# Patient Record
Sex: MALE | Race: WHITE | NOT HISPANIC OR LATINO | URBAN - METROPOLITAN AREA
[De-identification: names, ages, dates, MRNs, and addresses within clinical notes are randomized per-mention and may not be internally consistent; named-entity substitution may affect disease eponyms.]

---

## 2021-03-12 ENCOUNTER — TELEHEALTH PROVIDED OTHER THAN IN PATIENT'S HOME (OUTPATIENT)
Dept: URBAN - METROPOLITAN AREA TELEHEALTH 12 | Facility: TELEHEALTH | Age: 41
End: 2021-03-12

## 2021-03-12 VITALS — HEIGHT: 71 IN | WEIGHT: 180 LBS

## 2021-03-12 DIAGNOSIS — K52.9 NONINFECTIVE GASTROENTERITIS AND COLITIS, UNSPECIFIED: ICD-10-CM

## 2021-03-12 PROCEDURE — 99244 OFF/OP CNSLTJ NEW/EST MOD 40: CPT | Mod: 95 | Performed by: INTERNAL MEDICINE

## 2021-03-12 NOTE — SERVICEHPINOTES
PATIENT VERIFIED BY DATE OF BIRTH AND NAME. Patient has been consented for this telecommunication visit.   39 yo male with consult for hx of colitis.  Seen by me in the past.  Pt had flare of colitis - and diarrhea was significant.  Pt was placed back on Suboxone and his sx improved.  Pt still taking this med.  Pt trying to wean off of this.  Pt rarely takes Seroquel as well.  Pt takes this for sleep.  Pt was on 5ASA in the past.  Pt denies any blood in his stool.  Pt has one BM a day for the most part.  Pt o/w doing well.  Weight is stable.

## 2021-03-17 ENCOUNTER — OFFICE (OUTPATIENT)
Dept: URBAN - METROPOLITAN AREA CLINIC 102 | Facility: CLINIC | Age: 41
End: 2021-03-17

## 2021-03-17 PROCEDURE — 00038: CPT | Performed by: INTERNAL MEDICINE

## 2021-05-24 ENCOUNTER — OFFICE (OUTPATIENT)
Dept: URBAN - METROPOLITAN AREA CLINIC 102 | Facility: CLINIC | Age: 41
End: 2021-05-24

## 2021-05-24 PROCEDURE — 00038: CPT | Performed by: INTERNAL MEDICINE

## 2023-03-17 ENCOUNTER — TELEHEALTH PROVIDED OTHER THAN IN PATIENT'S HOME (OUTPATIENT)
Dept: URBAN - METROPOLITAN AREA TELEHEALTH 12 | Facility: TELEHEALTH | Age: 43
End: 2023-03-17
Payer: MEDICAID

## 2023-03-17 VITALS — WEIGHT: 230 LBS | HEIGHT: 71 IN

## 2023-03-17 DIAGNOSIS — K52.89 OTHER SPECIFIED NONINFECTIVE GASTROENTERITIS AND COLITIS: ICD-10-CM

## 2023-03-17 PROCEDURE — 99214 OFFICE O/P EST MOD 30 MIN: CPT | Mod: 95 | Performed by: INTERNAL MEDICINE

## 2023-03-17 RX ORDER — HYOSCYAMINE SULFATE 0.12 MG/1
0.38 TABLET, ORALLY DISINTEGRATING ORAL
Qty: 90 | Refills: 2 | Status: COMPLETED
Start: 2023-03-17 | End: 2024-07-10

## 2023-03-17 RX ORDER — BALSALAZIDE DISODIUM 750 MG/1
6750 CAPSULE ORAL
Qty: 270 | Refills: 2 | Status: COMPLETED
Start: 2023-03-17 | End: 2024-07-10

## 2023-03-17 NOTE — SERVICEHPINOTES
PATIENT VERIFIED BY DATE OF BIRTH AND NAME. Patient has been consented for this telecommunication visit.   41 yo male with colitis issues.  Pt still having some issues - with bloating, gas, blood in his stool.  Pt has more constipation then diarrhea - is more constipated at times.  Pt has been taking Suboxone - taking this for his back and neck  ROS o/w negative.

## 2023-04-17 ENCOUNTER — OFFICE (OUTPATIENT)
Dept: URBAN - METROPOLITAN AREA CLINIC 102 | Facility: CLINIC | Age: 43
End: 2023-04-17

## 2023-04-17 PROCEDURE — 00031: CPT | Performed by: INTERNAL MEDICINE

## 2023-05-08 ENCOUNTER — OFFICE (OUTPATIENT)
Dept: URBAN - METROPOLITAN AREA CLINIC 98 | Facility: CLINIC | Age: 43
End: 2023-05-08
Payer: MEDICAID

## 2023-05-08 VITALS
DIASTOLIC BLOOD PRESSURE: 59 MMHG | SYSTOLIC BLOOD PRESSURE: 107 MMHG | TEMPERATURE: 98.2 F | HEART RATE: 70 BPM | HEART RATE: 63 BPM | TEMPERATURE: 98 F | OXYGEN SATURATION: 100 % | SYSTOLIC BLOOD PRESSURE: 119 MMHG | DIASTOLIC BLOOD PRESSURE: 73 MMHG | OXYGEN SATURATION: 98 % | SYSTOLIC BLOOD PRESSURE: 98 MMHG | RESPIRATION RATE: 18 BRPM | SYSTOLIC BLOOD PRESSURE: 89 MMHG | RESPIRATION RATE: 6 BRPM | DIASTOLIC BLOOD PRESSURE: 51 MMHG | RESPIRATION RATE: 12 BRPM | HEART RATE: 59 BPM | SYSTOLIC BLOOD PRESSURE: 83 MMHG | TEMPERATURE: 96.6 F | SYSTOLIC BLOOD PRESSURE: 88 MMHG | DIASTOLIC BLOOD PRESSURE: 45 MMHG | DIASTOLIC BLOOD PRESSURE: 71 MMHG | HEART RATE: 60 BPM | DIASTOLIC BLOOD PRESSURE: 63 MMHG | RESPIRATION RATE: 16 BRPM | HEIGHT: 71 IN | OXYGEN SATURATION: 97 % | SYSTOLIC BLOOD PRESSURE: 112 MMHG | WEIGHT: 212 LBS | HEART RATE: 64 BPM

## 2023-05-08 DIAGNOSIS — K52.89 OTHER SPECIFIED NONINFECTIVE GASTROENTERITIS AND COLITIS: ICD-10-CM

## 2023-05-08 DIAGNOSIS — K57.30 DIVERTICULOSIS OF LARGE INTESTINE WITHOUT PERFORATION OR ABS: ICD-10-CM

## 2023-05-08 LAB
ANATOMIC PATHOLOGY REPORT: (no result)
PDF REPORT: PDF REPORT1: (no result)

## 2023-05-08 RX ORDER — LINACLOTIDE 145 UG/1
CAPSULE, GELATIN COATED ORAL
Qty: 30 | Refills: 1 | Status: COMPLETED
Start: 2023-05-08 | End: 2024-05-20

## 2024-07-10 ENCOUNTER — TELEHEALTH PROVIDED OTHER THAN IN PATIENT'S HOME (OUTPATIENT)
Dept: URBAN - METROPOLITAN AREA TELEHEALTH 12 | Facility: TELEHEALTH | Age: 44
End: 2024-07-10
Payer: MEDICAID

## 2024-07-10 VITALS — HEIGHT: 71 IN | WEIGHT: 204 LBS

## 2024-07-10 DIAGNOSIS — K59.09 OTHER CONSTIPATION: ICD-10-CM

## 2024-07-10 DIAGNOSIS — E66.3 OVERWEIGHT: ICD-10-CM

## 2024-07-10 DIAGNOSIS — K57.30 DIVERTICULOSIS OF LARGE INTESTINE WITHOUT PERFORATION OR ABS: ICD-10-CM

## 2024-07-10 DIAGNOSIS — K52.89 OTHER SPECIFIED NONINFECTIVE GASTROENTERITIS AND COLITIS: ICD-10-CM

## 2024-07-10 PROCEDURE — 99213 OFFICE O/P EST LOW 20 MIN: CPT | Mod: 95 | Performed by: PHYSICIAN ASSISTANT

## 2024-07-10 NOTE — SERVICEHPINOTES
PATIENT VERIFIED BY DATE OF BIRTH AND NAME. Patient has been consented for this telecommunication visit using [employer] application.   The pt is here for f/u. He saw Dr. Hines last year--he had a hx of colitis (mild colitis/proctitis) upon previous colonoscopies. Updated colonoscopy 5/8/23 showed no colitis. Pt had a lot of constipation so he was started on Linzess. Now, he is taking linzess daily. He has a BM daily to every 3 days on average--occasionally still going up to 1 week w/o a BM. Sometimes he has diarrhea from the linzess but overtime the diarrhea has improved. He doesn't want to try the higher dose of linzess-he is content with his current linzess dose. The linzess is helping "drastically" with the bloating. No other symptoms such as unexplained wt loss, ab pain, or rectal bleeding. ROS as per hpi and o/w is unremarkable. No other GI related complaints today.

## 2024-07-10 NOTE — SERVICENOTES
Patient was located in their home during visit., I spent 15 minutes today reviewing the chart, talking with the patient, reviewing previous results with patient, discussing plan, and documenting. Patient understands and agrees with plan. Questions/concerns addressed., Patient's visit was conducted through [employer] video telecommunication. Patient consented before the start of visit as to understanding of privacy concerns, possible technological failure, and their responsibility of carrying out instructions of plan.
negative

## 2025-02-17 ENCOUNTER — TELEHEALTH PROVIDED IN PATIENT'S HOME (OUTPATIENT)
Dept: URBAN - METROPOLITAN AREA TELEHEALTH 12 | Facility: TELEHEALTH | Age: 45
End: 2025-02-17
Payer: MEDICAID

## 2025-02-17 VITALS — HEIGHT: 71 IN | WEIGHT: 202 LBS

## 2025-02-17 DIAGNOSIS — K52.89 OTHER SPECIFIED NONINFECTIVE GASTROENTERITIS AND COLITIS: ICD-10-CM

## 2025-02-17 DIAGNOSIS — K59.09 OTHER CONSTIPATION: ICD-10-CM

## 2025-02-17 PROCEDURE — 99213 OFFICE O/P EST LOW 20 MIN: CPT | Mod: 95 | Performed by: INTERNAL MEDICINE

## 2025-02-17 RX ORDER — POLYETHYLENE GLYCOL 3350, SODIUM SULFATE ANHYDROUS, SODIUM BICARBONATE, SODIUM CHLORIDE, POTASSIUM CHLORIDE 236; 22.74; 6.74; 5.86; 2.97 G/4L; G/4L; G/4L; G/4L; G/4L
POWDER, FOR SOLUTION ORAL
Qty: 1 | Refills: 2 | Status: ACTIVE
Start: 2025-02-17

## 2025-02-17 RX ORDER — POLYETHYLENE GLYCOL 3350 17 G/17G
POWDER, FOR SOLUTION ORAL
Qty: 1 | Refills: 0 | Status: COMPLETED
Start: 2025-02-17 | End: 2025-03-13

## 2025-02-17 RX ORDER — LINACLOTIDE 145 UG/1
CAPSULE, GELATIN COATED ORAL
Qty: 90 | Refills: 3 | Status: COMPLETED
Start: 2024-05-20 | End: 2025-02-17

## 2025-02-17 RX ORDER — LINACLOTIDE 290 UG/1
CAPSULE, GELATIN COATED ORAL
Qty: 30 | Refills: 5 | Status: ACTIVE
Start: 2025-02-17

## 2025-02-17 NOTE — SERVICENOTES
Patient was located in their home during visit., I spent 15 minutes today reviewing the chart, talking with the patient, reviewing previous results with patient, discussing plan, and documenting. Patient understands and agrees with plan. Questions/concerns addressed., Patient's visit was conducted through [employer] video telecommunication. Patient consented before the start of visit as to understanding of privacy concerns, possible technological failure, and their responsibility of carrying out instructions of plan.

## 2025-02-17 NOTE — SERVICEHPINOTES
PATIENT VERIFIED BY DATE OF BIRTH AND NAME. Patient has been consented for this telecommunication visit using [employer] application.   45 yo male with issues with constipation, has this since started on Lunesta.  Pt states tried enema and suppository recently.  Pt has rectal pain and abdm pain as well.  Pt states tried Mag Citrate was well.  Pt had loss of electricity.  Pt went to the ER - given Gavalyte and had some BMs with this.  Pt did have significant BMs with this.  Pt states now has fear of constipation returning.  ROS o/w negative.

## 2025-03-13 ENCOUNTER — TELEHEALTH PROVIDED IN PATIENT'S HOME (OUTPATIENT)
Dept: URBAN - METROPOLITAN AREA TELEHEALTH 12 | Facility: TELEHEALTH | Age: 45
End: 2025-03-13
Payer: MEDICAID

## 2025-03-13 VITALS — WEIGHT: 195 LBS | HEIGHT: 71 IN

## 2025-03-13 DIAGNOSIS — K59.09 OTHER CONSTIPATION: ICD-10-CM

## 2025-03-13 DIAGNOSIS — F11.20 OPIOID DEPENDENCE, UNCOMPLICATED: ICD-10-CM

## 2025-03-13 DIAGNOSIS — K52.89 OTHER SPECIFIED NONINFECTIVE GASTROENTERITIS AND COLITIS: ICD-10-CM

## 2025-03-13 PROCEDURE — 99214 OFFICE O/P EST MOD 30 MIN: CPT | Mod: 95 | Performed by: INTERNAL MEDICINE

## 2025-03-13 NOTE — SERVICENOTES
Patient was located in their home during visit., I spent 15 minutes today reviewing the chart, talking with the patient, reviewing previous results with patient, discussing plan, and documenting. Patient understands and agrees with plan. Questions/concerns addressed., Patient's visit was conducted through [employer] video telecommunication. Patient consented before the start of visit as to understanding of privacy concerns, possible technological failure, and their responsibility of carrying out instructions of plan.

## 2025-03-13 NOTE — SERVICEHPINOTES
PATIENT VERIFIED BY DATE OF BIRTH AND NAME. Patient has been consented for this telecommunication visit using [employer] application.   45 yo male with loose stools with Linzess.  Having 4 BMs per day on Linzess 290 mcg.  Pt worried about this and would like to stop Linzess after stopping the Suboxone.  Pt still has a feeling of incomplete evacuation.  Would like to start probiotics.  Pt o/w stable.  Pt has a BM every day and as above sometimes many times per day.  Pt is eating more veggies and fruits.  ROS o/w negative.